# Patient Record
Sex: MALE | Race: OTHER | HISPANIC OR LATINO | URBAN - METROPOLITAN AREA
[De-identification: names, ages, dates, MRNs, and addresses within clinical notes are randomized per-mention and may not be internally consistent; named-entity substitution may affect disease eponyms.]

---

## 2018-01-15 ENCOUNTER — EMERGENCY (EMERGENCY)
Facility: HOSPITAL | Age: 33
LOS: 1 days | Discharge: HOME | End: 2018-01-15
Attending: EMERGENCY MEDICINE
Payer: SELF-PAY

## 2018-01-15 VITALS
HEART RATE: 75 BPM | RESPIRATION RATE: 18 BRPM | HEIGHT: 62 IN | TEMPERATURE: 98 F | SYSTOLIC BLOOD PRESSURE: 130 MMHG | DIASTOLIC BLOOD PRESSURE: 87 MMHG | OXYGEN SATURATION: 98 % | WEIGHT: 160.06 LBS

## 2018-01-15 VITALS
SYSTOLIC BLOOD PRESSURE: 115 MMHG | RESPIRATION RATE: 16 BRPM | OXYGEN SATURATION: 97 % | TEMPERATURE: 99 F | DIASTOLIC BLOOD PRESSURE: 65 MMHG | HEART RATE: 91 BPM

## 2018-01-15 PROCEDURE — 99284 EMERGENCY DEPT VISIT MOD MDM: CPT

## 2018-01-15 PROCEDURE — 72070 X-RAY EXAM THORAC SPINE 2VWS: CPT | Mod: 26

## 2018-01-15 PROCEDURE — 99284 EMERGENCY DEPT VISIT MOD MDM: CPT | Mod: 25

## 2018-01-15 PROCEDURE — 72100 X-RAY EXAM L-S SPINE 2/3 VWS: CPT | Mod: 26

## 2018-01-15 PROCEDURE — 72070 X-RAY EXAM THORAC SPINE 2VWS: CPT

## 2018-01-15 PROCEDURE — 71101 X-RAY EXAM UNILAT RIBS/CHEST: CPT | Mod: 26

## 2018-01-15 PROCEDURE — 71101 X-RAY EXAM UNILAT RIBS/CHEST: CPT

## 2018-01-15 PROCEDURE — 96372 THER/PROPH/DIAG INJ SC/IM: CPT

## 2018-01-15 PROCEDURE — 72100 X-RAY EXAM L-S SPINE 2/3 VWS: CPT

## 2018-01-15 RX ORDER — KETOROLAC TROMETHAMINE 30 MG/ML
60 SYRINGE (ML) INJECTION ONCE
Qty: 0 | Refills: 0 | Status: DISCONTINUED | OUTPATIENT
Start: 2018-01-15 | End: 2018-01-15

## 2018-01-15 RX ORDER — CYCLOBENZAPRINE HYDROCHLORIDE 10 MG/1
10 TABLET, FILM COATED ORAL ONCE
Qty: 0 | Refills: 0 | Status: COMPLETED | OUTPATIENT
Start: 2018-01-15 | End: 2018-01-15

## 2018-01-15 RX ORDER — CYCLOBENZAPRINE HYDROCHLORIDE 10 MG/1
1 TABLET, FILM COATED ORAL
Qty: 21 | Refills: 0 | OUTPATIENT
Start: 2018-01-15 | End: 2018-01-21

## 2018-01-15 RX ORDER — IBUPROFEN 200 MG
1 TABLET ORAL
Qty: 30 | Refills: 0 | OUTPATIENT
Start: 2018-01-15 | End: 2018-01-24

## 2018-01-15 RX ADMIN — Medication 60 MILLIGRAM(S): at 12:57

## 2018-01-15 RX ADMIN — CYCLOBENZAPRINE HYDROCHLORIDE 10 MILLIGRAM(S): 10 TABLET, FILM COATED ORAL at 12:58

## 2018-01-15 NOTE — ED PROVIDER NOTE - PROGRESS NOTE DETAILS
Pt reassessed, abd soft, nt, tolerating po, no cva tenderness, Xrays noted. Will dc to follow up with ortho. Precautions reviewed.
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
## 2019-02-13 NOTE — ED PROVIDER NOTE - NS ED MD EM SELECTION
Quality 110: Preventive Care And Screening: Influenza Immunization: Influenza immunization was not ordered or administered, reason not given
Detail Level: Detailed
Quality 111:Pneumonia Vaccination Status For Older Adults: Pneumococcal Vaccination Previously Received
[address] Detailed

## 2019-09-09 ENCOUNTER — EMERGENCY (EMERGENCY)
Facility: HOSPITAL | Age: 34
LOS: 1 days | Discharge: HOME | End: 2019-09-09
Attending: EMERGENCY MEDICINE
Payer: SELF-PAY

## 2019-09-09 VITALS
RESPIRATION RATE: 18 BRPM | HEIGHT: 63 IN | OXYGEN SATURATION: 98 % | TEMPERATURE: 98 F | HEART RATE: 65 BPM | DIASTOLIC BLOOD PRESSURE: 77 MMHG | WEIGHT: 164.91 LBS | SYSTOLIC BLOOD PRESSURE: 121 MMHG

## 2019-09-09 VITALS
DIASTOLIC BLOOD PRESSURE: 94 MMHG | SYSTOLIC BLOOD PRESSURE: 119 MMHG | RESPIRATION RATE: 18 BRPM | HEART RATE: 78 BPM | TEMPERATURE: 98 F | OXYGEN SATURATION: 98 %

## 2019-09-09 PROCEDURE — 29515 APPLICATION SHORT LEG SPLINT: CPT | Mod: RT

## 2019-09-09 PROCEDURE — 73620 X-RAY EXAM OF FOOT: CPT

## 2019-09-09 PROCEDURE — 73610 X-RAY EXAM OF ANKLE: CPT | Mod: 26,RT

## 2019-09-09 PROCEDURE — 99284 EMERGENCY DEPT VISIT MOD MDM: CPT

## 2019-09-09 PROCEDURE — 73590 X-RAY EXAM OF LOWER LEG: CPT | Mod: 26,RT

## 2019-09-09 PROCEDURE — 99284 EMERGENCY DEPT VISIT MOD MDM: CPT | Mod: 25

## 2019-09-09 PROCEDURE — 73620 X-RAY EXAM OF FOOT: CPT | Mod: 26,RT

## 2019-09-09 PROCEDURE — 73700 CT LOWER EXTREMITY W/O DYE: CPT | Mod: 26,RT

## 2019-09-09 PROCEDURE — 73590 X-RAY EXAM OF LOWER LEG: CPT

## 2019-09-09 PROCEDURE — 73700 CT LOWER EXTREMITY W/O DYE: CPT

## 2019-09-09 PROCEDURE — 73610 X-RAY EXAM OF ANKLE: CPT

## 2019-09-09 RX ORDER — IBUPROFEN 200 MG
600 TABLET ORAL ONCE
Refills: 0 | Status: COMPLETED | OUTPATIENT
Start: 2019-09-09 | End: 2019-09-09

## 2019-09-09 RX ORDER — CYCLOBENZAPRINE HYDROCHLORIDE 10 MG/1
1 TABLET, FILM COATED ORAL
Qty: 15 | Refills: 0
Start: 2019-09-09

## 2019-09-09 RX ORDER — DIAZEPAM 5 MG
5 TABLET ORAL ONCE
Refills: 0 | Status: DISCONTINUED | OUTPATIENT
Start: 2019-09-09 | End: 2019-09-09

## 2019-09-09 RX ORDER — OXYCODONE AND ACETAMINOPHEN 5; 325 MG/1; MG/1
1 TABLET ORAL ONCE
Refills: 0 | Status: DISCONTINUED | OUTPATIENT
Start: 2019-09-09 | End: 2019-09-09

## 2019-09-09 RX ADMIN — OXYCODONE AND ACETAMINOPHEN 1 TABLET(S): 5; 325 TABLET ORAL at 13:16

## 2019-09-09 RX ADMIN — Medication 600 MILLIGRAM(S): at 09:58

## 2019-09-09 RX ADMIN — OXYCODONE AND ACETAMINOPHEN 1 TABLET(S): 5; 325 TABLET ORAL at 14:53

## 2019-09-09 RX ADMIN — Medication 5 MILLIGRAM(S): at 08:45

## 2019-09-09 RX ADMIN — Medication 600 MILLIGRAM(S): at 08:45

## 2019-09-09 NOTE — ED PROVIDER NOTE - OBJECTIVE STATEMENT
33 year old male with no significant PMHx presents to the ED s/p fall after playing soccer yesterday in which he inverted his R ankle and felt it crack with complaints of R ankle pain that is worsening today. Pt notes that he is unable to ambulate or bear any weight. Pt denies fever, nausea, vomiting, or any other acute complaints. NKDA. 33 year old male with no significant PMHx presents to the ED s/p fall after playing soccer yesterday. States inverted his R ankle and felt it crack with complaints of R ankle pain that is worsening today. Pt notes that he is unable to ambulate or bear any weight. Pt denies fever, nausea, vomiting, paresthesias or any other acute complaints. NKDA.

## 2019-09-09 NOTE — ED PROVIDER NOTE - PATIENT PORTAL LINK FT
You can access the FollowMyHealth Patient Portal offered by [facility] by registering at the following website: [email or web address]. By joining [employer]’s FollowMyHealth portal, you will also be able to view your health information using other applications (apps) compatible with our system.

## 2019-09-09 NOTE — ED PROVIDER NOTE - MUSCULOSKELETAL, MLM
dp and tp pulses palpable. Diffuse lateral malleolus swelling and erythema. Decreased ROM on flexion and extension. Unable to bear weight on R ankle. Full ROM on knee and hips. dp and tp pulses palpable 2/4. Diffuse lateral malleolus swelling and erythema. Decreased ROM on flexion and extension. Unable to bear weight on R ankle. Full ROM on knee and hips.

## 2019-09-09 NOTE — ED PROVIDER NOTE - CLINICAL SUMMARY MEDICAL DECISION MAKING FREE TEXT BOX
32 yo M with right ankle sprain s/p soccer injury. Unable to bear weight. XR of foot remarkable for possible navicular fracture. Podiatry consulted and saw patient in the ED. Splinted. CT foot ordered for possible posterior talar fx. CT scan negative for acute fx. Podiatry aware and patient to remain in splint and follow up in clinic tomorrow. Vital signs stable. Nontoxic and medically stable for discharged. Return precautions provided and patient understands to return to the ED for worsening signs and symptoms. Crutches and pain meds provided. Patient's questions answered.

## 2019-09-09 NOTE — ED PROVIDER NOTE - PROGRESS NOTE DETAILS
Discussed case with podiatry and XR results. Dr. Acosta will come evaluate patient in the ED patient splinted by podiatry. CT scan ordered and patient to follow up at the podiatry clinic tomorrow. Pain meds provided.

## 2019-09-09 NOTE — ED PROVIDER NOTE - NSFOLLOWUPCLINICS_GEN_ALL_ED_FT
[facility]  Podiatry/Wound Care  [address] [place] [address]  Phone: [phone]  Fax: [phone]

## 2019-09-09 NOTE — ED PROVIDER NOTE - NSFOLLOWUPINSTRUCTIONS_ED_ALL_ED_FT
normal... Well appearing, well nourished, awake, alert, oriented to person, place, time/situation and in no apparent distress. You were seen today for your foot pain. You may have a foot fracture. Please follow up with podiatry at your scheduled appointment tomorrow. Please return to the Emergency Department for worsening signs or symptoms.
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
## 2019-09-10 ENCOUNTER — OUTPATIENT (OUTPATIENT)
Dept: OUTPATIENT SERVICES | Facility: HOSPITAL | Age: 34
LOS: 1 days | End: 2019-09-10
Payer: MEDICAID

## 2019-09-10 ENCOUNTER — APPOINTMENT (OUTPATIENT)
Dept: PODIATRY | Facility: CLINIC | Age: 34
End: 2019-09-10

## 2019-09-10 VITALS
DIASTOLIC BLOOD PRESSURE: 81 MMHG | HEART RATE: 59 BPM | HEIGHT: 62 IN | WEIGHT: 165 LBS | SYSTOLIC BLOOD PRESSURE: 123 MMHG | BODY MASS INDEX: 30.36 KG/M2 | OXYGEN SATURATION: 99 % | RESPIRATION RATE: 18 BRPM | TEMPERATURE: 98.2 F

## 2019-09-10 DIAGNOSIS — Z00.00 ENCOUNTER FOR GENERAL ADULT MEDICAL EXAMINATION WITHOUT ABNORMAL FINDINGS: ICD-10-CM

## 2019-09-10 DIAGNOSIS — M79.671 PAIN IN RIGHT FOOT: ICD-10-CM

## 2019-09-10 DIAGNOSIS — Z78.9 OTHER SPECIFIED HEALTH STATUS: ICD-10-CM

## 2019-09-10 PROCEDURE — G0463: CPT

## 2019-09-10 NOTE — PROCEDURE
[FreeTextEntry1] : Subjective: \par Patient is pleasant ambulating in posterior splint NWB right foot with crutches.\par Vascular: DP/PT pulses easily palpable, Skin temp cool to cool. Moderate edema surrounding the ankle. \par Derm: No ecchymosis, no open lesions. \par MSK: Pain with passive ROM to the ankle, pain with passive 1st MPJ ROM in the posterior ankle. Pain to palpation to the CFL. \par Neuro: gross sensation intact\par \par A: os trigonum Right foot\par possible anterior beak fracture right foot s/p eversion ankle injury\par \par Plan;\par pt. examined evaluted, all findings discussed. \par X-ray reviewed with pt. \par CT scan reviewed with pt. possible anterior beak fracture. \par Continue NWB status in posterior splint with hernandez compression to reduce swelling. \par RTC in 2 weeks for re-evaluation with new x-ray may transition to CAM boot next visit.
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
## 2019-09-11 DIAGNOSIS — M25.571 PAIN IN RIGHT ANKLE AND JOINTS OF RIGHT FOOT: ICD-10-CM

## 2019-09-16 ENCOUNTER — OUTPATIENT (OUTPATIENT)
Dept: OUTPATIENT SERVICES | Facility: HOSPITAL | Age: 34
LOS: 1 days | End: 2019-09-16
Payer: MEDICAID

## 2019-09-16 DIAGNOSIS — M79.671 PAIN IN RIGHT FOOT: ICD-10-CM

## 2019-09-16 PROCEDURE — 73630 X-RAY EXAM OF FOOT: CPT

## 2019-09-16 PROCEDURE — 73630 X-RAY EXAM OF FOOT: CPT | Mod: 26,RT

## 2019-09-24 ENCOUNTER — APPOINTMENT (OUTPATIENT)
Dept: PODIATRY | Facility: CLINIC | Age: 34
End: 2019-09-24

## 2019-09-24 ENCOUNTER — OUTPATIENT (OUTPATIENT)
Dept: OUTPATIENT SERVICES | Facility: HOSPITAL | Age: 34
LOS: 1 days | End: 2019-09-24
Payer: MEDICAID

## 2019-09-24 VITALS
OXYGEN SATURATION: 98 % | HEART RATE: 70 BPM | SYSTOLIC BLOOD PRESSURE: 127 MMHG | RESPIRATION RATE: 18 BRPM | DIASTOLIC BLOOD PRESSURE: 72 MMHG | TEMPERATURE: 98 F | HEIGHT: 62 IN

## 2019-09-24 DIAGNOSIS — S93.401A SPRAIN OF UNSPECIFIED LIGAMENT OF RIGHT ANKLE, INITIAL ENCOUNTER: ICD-10-CM

## 2019-09-24 DIAGNOSIS — Z00.00 ENCOUNTER FOR GENERAL ADULT MEDICAL EXAMINATION WITHOUT ABNORMAL FINDINGS: ICD-10-CM

## 2019-09-24 DIAGNOSIS — M79.671 PAIN IN RIGHT FOOT: ICD-10-CM

## 2019-09-24 PROCEDURE — G0463: CPT
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
## 2019-09-24 NOTE — PROCEDURE
[FreeTextEntry1] : Subjective: \par Patient is pleasant ambulating in posterior splint NWB right foot with crutches.\par Vascular: DP/PT pulses easily palpable, Skin temp cool to cool. Moderate edema surrounding the ankle. \par Derm: No ecchymosis, no open lesions. \par MSK: Pain with passive ROM to the ankle, pain with passive 1st MPJ ROM in the posterior ankle. Pain to palpation to the CFL. \par Neuro: gross sensation intact\par ACC: pain along peroneals \par \par A: os trigonum Right foot\par possible anterior beak fracture right foot s/p eversion ankle injury\par \par Plan;\par pt. examined evaluted, all findings discussed. \par X-ray reviewed with pt. \par CT scan reviewed with pt. possible anterior beak fracture. \par Dispensed CAM boot to right foot\par RTC in 2 weeks for re-evaluation, advised CAM boot daily anytime weight bearing. Possible MRI next visit

## 2019-10-09 ENCOUNTER — APPOINTMENT (OUTPATIENT)
Dept: PODIATRY | Facility: CLINIC | Age: 34
End: 2019-10-09

## 2019-10-09 ENCOUNTER — OUTPATIENT (OUTPATIENT)
Dept: OUTPATIENT SERVICES | Facility: HOSPITAL | Age: 34
LOS: 1 days | End: 2019-10-09
Payer: MEDICAID

## 2019-10-09 VITALS
BODY MASS INDEX: 30.36 KG/M2 | HEIGHT: 62 IN | WEIGHT: 165 LBS | DIASTOLIC BLOOD PRESSURE: 72 MMHG | OXYGEN SATURATION: 99 % | TEMPERATURE: 97.7 F | HEART RATE: 60 BPM | RESPIRATION RATE: 18 BRPM | SYSTOLIC BLOOD PRESSURE: 114 MMHG

## 2019-10-09 DIAGNOSIS — Z00.00 ENCOUNTER FOR GENERAL ADULT MEDICAL EXAMINATION WITHOUT ABNORMAL FINDINGS: ICD-10-CM

## 2019-10-09 PROCEDURE — G0463: CPT

## 2019-10-09 NOTE — PROCEDURE
[FreeTextEntry1] : Subjective: \par Patient is pleasant male ambulating in sneakers.  \par Vascular: DP/PT pulses easily palpable, Skin temp cool to cool. Moderate edema surrounding the ankle. \par Derm: No ecchymosis, no open lesions. \par MSK: No pain with passive ROM to the ankle, No pain with passive 1st MPJ ROM in the posterior ankle. Pain to palpation to the CFL. \par Neuro: gross sensation intact\par ACC: pain along peroneals \par \par A: os trigonum Right foot\par possible anterior beak fracture right foot s/p eversion ankle injury\par \par Plan;\par pt. examined evaluted, all findings discussed. \par Previous X-ray reviewed with pt. \par CT scan reviewed with pt. possible anterior beak fracture. \par Recommend gradual transition to activity as tolerated\par Recommend ASO for sporting activity\par Return to clinic as needed if any problems or concerns arise.\par
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
## 2019-10-11 DIAGNOSIS — M25.571 PAIN IN RIGHT ANKLE AND JOINTS OF RIGHT FOOT: ICD-10-CM

## 2021-03-18 NOTE — ED ADULT NURSE NOTE - CHPI ED NUR SYMPTOMS POS
Refill request from pharmacy for losartan tab. Refill sent to pharmacy. Last CPX was 10/12/2020.  -RT   PAIN

## 2022-05-10 NOTE — ED ADULT NURSE NOTE - CHIEF COMPLAINT
4 Steps for Eating Healthier  Changing the way you eat can improve your health. It can lower your cholesterol and blood pressure, and help you stay at a healthy weight. Your diet doesn’t have to be bland and boring to be healthy. Just watch your calories and follow these steps:    Step 1. Eat fewer unhealthy fats  · Choose more fish and lean meats instead of fatty cuts of meat.  · Skip butter and lard, and use less margarine.  · Pass on foods that have palm, coconut, or hydrogenated oils.  · Eat fewer high-fat dairy foods like cheese, ice cream, and whole milk.  · Get a heart-healthy cookbook and try some low-fat recipes.  Step 2. Go light on salt  · Keep the saltshaker off the table.  · Limit high-salt ingredients, such as soy sauce, bouillon, and garlic salt.  · Instead of adding salt when cooking, season your food with herbs and flavorings. Try lemon, garlic, and onion, or salt-free herb seasonings.  · Limit convenience foods, such as boxed or canned foods and restaurant food.  · Read food labels and choose lower-sodium options.  Step 3. Limit sugar  · Pause before you add sugars to pancakes, cereal, coffee, or tea. This includes white and brown table sugar, syrup, honey, and molasses. Cut your usual amount by half.  · Use non-sugar sweeteners. Stevia, aspartame, and sucralose can satisfy a sweet tooth without adding calories.  · Swap out sugar-filled soda and other drinks. Buy sugar-free or low-calorie beverages. Remember water is always the best choice.  · Read labels and choose foods with less added sugar. Keep in mind that dairy foods and foods with fruit will have some natural sugar.  · Cut the sugar in recipes by 1/3 to 1/2. Boost the flavor with extracts like almond, vanilla, or orange. Or add spices such as cinnamon or nutmeg.  Step 4. Eat more fiber  · Eat fresh fruits and vegetables every day.  · Boost your diet with whole grains. Go for oats, whole-grain rice, and bran.  · Add beans and lentils to  your meals.  · Drink more water to match your fiber increase to help prevent constipation.  Date Last Reviewed: 6/1/2017  © 8934-4034 [employer]. [address] [place] [address], [place] [address]. All rights reserved. This information is not intended as a substitute for professional medical care. Always follow your healthcare professional's instructions.         The patient is a 33y Male complaining of ankle pain/injury.

## 2023-01-19 NOTE — ED ADULT NURSE NOTE - NSSUHOSCREENINGYN_ED_ALL_ED
SUICIDE/SELF-INJURIOUS BEHAVIOR
SUICIDE/SELF-INJURIOUS BEHAVIOR
No - the patient is unable to be screened due to medical condition

## 2023-10-25 ENCOUNTER — EMERGENCY (EMERGENCY)
Facility: HOSPITAL | Age: 38
LOS: 1 days | Discharge: HOME | End: 2023-10-25
Attending: STUDENT IN AN ORGANIZED HEALTH CARE EDUCATION/TRAINING PROGRAM
Payer: COMMERCIAL

## 2023-10-25 VITALS
OXYGEN SATURATION: 97 % | TEMPERATURE: 98 F | HEART RATE: 74 BPM | SYSTOLIC BLOOD PRESSURE: 130 MMHG | DIASTOLIC BLOOD PRESSURE: 88 MMHG | RESPIRATION RATE: 18 BRPM

## 2023-10-25 VITALS
TEMPERATURE: 98 F | RESPIRATION RATE: 17 BRPM | DIASTOLIC BLOOD PRESSURE: 94 MMHG | SYSTOLIC BLOOD PRESSURE: 142 MMHG | OXYGEN SATURATION: 95 % | HEIGHT: 62 IN | HEART RATE: 88 BPM | WEIGHT: 177.91 LBS

## 2023-10-25 PROCEDURE — 72125 CT NECK SPINE W/O DYE: CPT | Mod: 26,MA

## 2023-10-25 PROCEDURE — 70450 CT HEAD/BRAIN W/O DYE: CPT | Mod: MA

## 2023-10-25 PROCEDURE — 99284 EMERGENCY DEPT VISIT MOD MDM: CPT | Mod: 25

## 2023-10-25 PROCEDURE — 96372 THER/PROPH/DIAG INJ SC/IM: CPT

## 2023-10-25 PROCEDURE — 70450 CT HEAD/BRAIN W/O DYE: CPT | Mod: 26,MA

## 2023-10-25 PROCEDURE — 99284 EMERGENCY DEPT VISIT MOD MDM: CPT

## 2023-10-25 PROCEDURE — 72125 CT NECK SPINE W/O DYE: CPT | Mod: MA

## 2023-10-25 PROCEDURE — 99053 MED SERV 10PM-8AM 24 HR FAC: CPT

## 2023-10-25 RX ORDER — METHOCARBAMOL 500 MG/1
1500 TABLET, FILM COATED ORAL ONCE
Refills: 0 | Status: COMPLETED | OUTPATIENT
Start: 2023-10-25 | End: 2023-10-25

## 2023-10-25 RX ORDER — LIDOCAINE 4 G/100G
1 CREAM TOPICAL ONCE
Refills: 0 | Status: COMPLETED | OUTPATIENT
Start: 2023-10-25 | End: 2023-10-25

## 2023-10-25 RX ORDER — ACETAMINOPHEN 500 MG
975 TABLET ORAL ONCE
Refills: 0 | Status: COMPLETED | OUTPATIENT
Start: 2023-10-25 | End: 2023-10-25

## 2023-10-25 RX ORDER — KETOROLAC TROMETHAMINE 30 MG/ML
15 SYRINGE (ML) INJECTION ONCE
Refills: 0 | Status: DISCONTINUED | OUTPATIENT
Start: 2023-10-25 | End: 2023-10-25

## 2023-10-25 RX ORDER — METHOCARBAMOL 500 MG/1
2 TABLET, FILM COATED ORAL
Qty: 16 | Refills: 0
Start: 2023-10-25 | End: 2023-10-27

## 2023-10-25 RX ORDER — IBUPROFEN 200 MG
400 TABLET ORAL ONCE
Refills: 0 | Status: COMPLETED | OUTPATIENT
Start: 2023-10-25 | End: 2023-10-25

## 2023-10-25 RX ADMIN — Medication 975 MILLIGRAM(S): at 09:50

## 2023-10-25 RX ADMIN — METHOCARBAMOL 1500 MILLIGRAM(S): 500 TABLET, FILM COATED ORAL at 08:41

## 2023-10-25 RX ADMIN — Medication 400 MILLIGRAM(S): at 09:50

## 2023-10-25 RX ADMIN — LIDOCAINE 1 PATCH: 4 CREAM TOPICAL at 08:10

## 2023-10-25 RX ADMIN — Medication 975 MILLIGRAM(S): at 08:11

## 2023-10-25 RX ADMIN — Medication 15 MILLIGRAM(S): at 12:15

## 2023-10-25 RX ADMIN — Medication 400 MILLIGRAM(S): at 08:11

## 2023-10-25 NOTE — ED PROVIDER NOTE - PATIENT PORTAL LINK FT
You can access the FollowMyHealth Patient Portal offered by [facility] by registering at the following website: [email or web address]. By joining [employer]s FollowMyHealth portal, you will also be able to view your health information using other applications (apps) compatible with our system.

## 2023-10-25 NOTE — ED PROVIDER NOTE - PROGRESS NOTE DETAILS
pt reassesssed, feeling better. images and plan d/w pt. all questions answered. pt ready and stable for DC. -Ruben Boateng PA-C
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
## 2023-10-25 NOTE — ED PROVIDER NOTE - PHYSICAL EXAMINATION
GEN: Pt in NAD, A&O x3. GCS 15.   EYES: No periorbital ecchymosis, sclera white w/o injection PERRL, EOMI.  HENT: Head NCAT. Nares without deformity, no DC. No auricular tenderness, no ear DC. no Guerra's sign. No dental trauma. Neck supple, able to rotate 45' b/l. Trachea midline.   RESP: No retractions or chest wall deformities, no signs of trauma/bruising. No chest wall tenderness, CTA b/l, no wheezes, rales, or rhonchi.   CARDIAC: RRR clear distinct S1, S2, no murmurs, gallops, or rubs.   ABDOMEN: Abdomen without any obvious deformities, no signs of trauma or bruising. Abdomen soft, non-tender. No CVAT b/l.   VASC: 2+ radial and dorsalis pedis pulses b/l.   MSK: No joint erythema or obvious deformities. Spine without obvious deformity. No midline spinal tenderness or step-offs. +tenderness of left upper trapezius muscle belly and paraspinal musculature in the left cervical region. Pelvis stable. No bony tenderness. +tenderness of left lateral distal leg. FROM w/o pain of UE and LE b/l.  Ambulatory without pain.  NEURO: CN2-12 intact. Normal and equal sensation UE, LE and face b/l. 5/5 strength upper and lower extremity b/l. Pronator drift negative. Steady gait.  SKIN: No rashes noted.

## 2023-10-25 NOTE — ED ADULT TRIAGE NOTE - BANDS:
Fall Risk; Bilobed Transposition Flap Text: The defect edges were debeveled with a #15 scalpel blade.  Given the location of the defect and the proximity to free margins a bilobed transposition flap was deemed most appropriate.  Using a sterile surgical marker, an appropriate bilobe flap drawn around the defect.    The area thus outlined was incised deep to adipose tissue with a #15 scalpel blade.  The skin margins were undermined to an appropriate distance in all directions utilizing iris scissors.
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
## 2023-10-25 NOTE — ED ADULT NURSE NOTE - NSFALLUNIVINTERV_ED_ALL_ED
done
Bed/Stretcher in lowest position, wheels locked, appropriate side rails in place/Call bell, personal items and telephone in reach/Instruct patient to call for assistance before getting out of bed/chair/stretcher/Non-slip footwear applied when patient is off stretcher/Patrick to call system/Physically safe environment - no spills, clutter or unnecessary equipment/Purposeful proactive rounding/Room/bathroom lighting operational, light cord in reach
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
## 2023-10-25 NOTE — ED PROVIDER NOTE - NS ED ATTENDING STATEMENT MOD
This was a shared visit with the ADRIANNE. I reviewed and verified the documentation and independently performed the documented:
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
## 2024-04-18 ENCOUNTER — EMERGENCY (EMERGENCY)
Facility: HOSPITAL | Age: 39
LOS: 1 days | Discharge: HOME | End: 2024-04-18
Attending: STUDENT IN AN ORGANIZED HEALTH CARE EDUCATION/TRAINING PROGRAM
Payer: MEDICAID

## 2024-04-18 VITALS
WEIGHT: 176.37 LBS | OXYGEN SATURATION: 97 % | HEART RATE: 67 BPM | TEMPERATURE: 98 F | RESPIRATION RATE: 18 BRPM | SYSTOLIC BLOOD PRESSURE: 115 MMHG | DIASTOLIC BLOOD PRESSURE: 74 MMHG

## 2024-04-18 DIAGNOSIS — Z98.890 OTHER SPECIFIED POSTPROCEDURAL STATES: Chronic | ICD-10-CM

## 2024-04-18 PROCEDURE — 99284 EMERGENCY DEPT VISIT MOD MDM: CPT

## 2024-04-18 PROCEDURE — 99283 EMERGENCY DEPT VISIT LOW MDM: CPT

## 2024-04-18 RX ORDER — POLYMYXIN B SULF/TRIMETHOPRIM 10000-1/ML
1 DROPS OPHTHALMIC (EYE)
Qty: 1 | Refills: 0
Start: 2024-04-18 | End: 2024-04-24

## 2024-04-18 RX ORDER — OLOPATADINE HYDROCHLORIDE 1 MG/ML
1 SOLUTION/ DROPS OPHTHALMIC
Qty: 1 | Refills: 0
Start: 2024-04-18 | End: 2024-04-24

## 2024-04-18 NOTE — ED PROVIDER NOTE - CLINICAL SUMMARY MEDICAL DECISION MAKING FREE TEXT BOX
Pt has pain and redness bilaterally no purulence. reports pruritus  Likely allergic conjunctivitis, wood lamp shows some abrasion on the right medial sclera  Polytrim, and Pataday sent to the pharmacy

## 2024-04-18 NOTE — ED PROVIDER NOTE - PATIENT PORTAL LINK FT
You can access the FollowMyHealth Patient Portal offered by [facility] by registering at the following website: [email or web address]. By joining [employer]’s FollowMyHealth portal, you will also be able to view your health information using other applications (apps) compatible with our system.

## 2024-04-18 NOTE — ED PROVIDER NOTE - NS ED ATTENDING STATEMENT MOD
I have seen and examined this patient and fully participated in the care of this patient as the teaching attending.  The service was shared with the ADRIANNE.  I reviewed and verified the documentation.

## 2024-04-18 NOTE — ED PROVIDER NOTE - PHYSICAL EXAMINATION
PHYSICAL EXAM    GENERAL: NAD, sitting in the chair  HEAD:  Atraumatic, Normocephalic  EYES: EOMI, PERRLA, conjunctiva is injected, and has small excoriations on the sclera, mild pain with EOM movement, bilaterally lacrimation and some small amonts of purulent drainage  CHEST/LUNG: Clear to auscultation bilaterally; No rales, rhonchi, wheezing, or rubs. Unlabored respirations  HEART: Regular rate and rhythm; No murmurs, rubs, or gallops  ABDOMEN: Bowel sounds present; Soft, Nontender, Nondistended. No hepatomegally  EXTREMITIES:  2+ Peripheral Pulses, brisk capillary refill. No clubbing, cyanosis, or edema  NERVOUS SYSTEM:  Alert & Oriented X3, speech clear. No deficits

## 2024-04-18 NOTE — ED PROVIDER NOTE - ATTENDING CONTRIBUTION TO CARE
I saw and evaluated the patient, and discussed the case with the resident physican.    I agree with their findings and plan as documented in their note in the patient’s medical record.    Given History and Exam, presentation most consistent with corneal abrasions.    Disposition: Discharge. Discussed strict return precautions with patient

## 2024-04-18 NOTE — ED PROVIDER NOTE - OBJECTIVE STATEMENT
38 y o male PMH of seasonal allergies, relieved by nasal sprays, comes in with eye pain and eye redness bilaterally, the pattern which is different/ worse from his usual conjunctival itching. He states that when he was at work he felt as though the eyes had some rocks inside, where he was rubbing them for relief. Pt went home, irrigated, used artificial tears, and went to bed. However, this morning the patient felt more pain, more redness, inability to open eyes and difficulty with light tolerance. He came to the ED. He has had blurry vision, described as blue smoke in the upper half of his vision. He denies any visual loss, loss of taste, smell, ear pain, fever, chills, nausea, vomiting, nasal congestion, cough or shortness of breath.    Of note: a week ago, he had fever, and diarrhea, and foul smelling urine,  which have resolved.   Over the weekend he hit his head accidentally, on the right side, and now has slight headache  Otherwise, no PMH, no FMH, on no meds, not allergic to any, has had knee surgery, and does not smoke, social drinker, and no recreational drugs

## 2024-04-18 NOTE — ED ADULT NURSE NOTE - NSFALLUNIVINTERV_ED_ALL_ED
Bed/Stretcher in lowest position, wheels locked, appropriate side rails in place/Call bell, personal items and telephone in reach/Instruct patient to call for assistance before getting out of bed/chair/stretcher/Non-slip footwear applied when patient is off stretcher/[place]illager to call system/Physically safe environment - no spills, clutter or unnecessary equipment/Purposeful proactive rounding/Room/bathroom lighting operational, light cord in reach

## 2024-04-18 NOTE — ED PROVIDER NOTE - NSFOLLOWUPINSTRUCTIONS_ED_ALL_ED_FT
You came in with redness and eye pain.  you were examined with a wood lamp test, which showed some small tear in the right eye, Likely due to itching the eyes from allergies    Please take polytrim and Pataday eyedrops every 6 hours: sent to your pharmacy   Please Wear protective shields at work daily   Please See an eye doctor for follow up   Chris return to the ED, if you have any worsening pain, no improvement after 2 weeks, pus and drainage, fever, chills, loss of vision, painful eye movements, changes in the skin around the eyes, or bleeding eyes.